# Patient Record
(demographics unavailable — no encounter records)

---

## 2024-10-08 NOTE — HISTORY OF PRESENT ILLNESS
[Other: _____] : [unfilled] [FreeTextEntry1] : RASHID WALLS is a 94 year old female here for a follow up visit. [de-identified] : Mrs. Staton has high cholesterol, HTN, MARLON (on CPAP), h/o severe aortic stenosis (s/p TAVR 4/2023), IFG, GERD, chronic pain/OA, osteoporosis, depression/anxiety, h/o anemia (resolved)  Mrs. Pack s specialists include: Dr. Peggy Blas ([facility], card), Dr. Shelton Tran ([facility], [place] surg), Dr. Jaison Aranda (GI), Dr. Momin (pain mgmt.), Dr. Zuniga (ortho, was d/c to prn f/u at most recent visit 9/2023), Dr. Leon (ortho/hand), and Dr. Braxton (neuro), Dr. Alberts (ENT), Dr. Wong (podiatry)  Taking Pravastatin 20 mg TIW and 10 mg other days as higher doses (daily 20 mg dose) caused worsening of her chronic leg pains.  She is using CPAP consistently which has helped to decrease daytime fatigue. Memory is not improved with treating the MARLON or with time past 2022 hospitalization/trauma/surgery/anaesthesia, though memory also not clearly worsening for now so for now they are deferred on trial of donepezil which is reasonable. Memory is not improved but her confusion is signif improved with treating the sleep apnea.  No new or acute sxs but sxs of fatigue and joint pain etc have been slowly worsening over time. She takes Tylenol 4 pills daily for pain. Can not tolerate Tramadol or opioids. Avoids NSAIDs given age and cardiac and other comorbidities. CBD used but not making signif difference in her sxs. Uses Lidoderm patches.  Dr. Momin and I have been trying to figure out combination of med to help both mood (depression/anxiety) as well as her chronic pain. See prior notes for details.  As of 9/2024 Dr. Momin increased her duloxetine to 60 mg daily and sertraline was stopped.  Duloxetine 60 mg helps with her back pain signif. Legs feel painful from B knees down. Sensation is decreased and feels like they don't move the way she wants them to. Doing PT and trying to be more active at home. Not clearly tingling/burning pain but might be neuropathic pain from what she is describing. Has consider trial of gabapentin with Dr. Momin in past though opted to defer due to poss SE of drowsiness/cognitive effects with med. Has not had EMG/NCV and they will d/w Dr. Braxton to see if should consider this. Will check B12 with labs today.   In 7/2024 Liv reported that her mom was experiencing episodic exhaustion associated with  bradycardia. Recommended she d/w Dr. Blas re having EP card eval.   Had heart rate monitor x 2 wks in 8/2024 and all was good. "Nothing of concern at all". Echo is also UTD and stable . Liv will sent in holter monitor results  She got to take a trip to [place] this summer and connected with some family she has not seen in a while. Loved it  Has had slow wgt gain of about 10#  over past year. Is not as active as she was in the past. Eating well overall and no signif changes and portions are reasonable. Not a snacked between meals. used to do PT 4x/wk, now doing 2x/wk, Liv is encouraging mom to incr levels of physical activity and will work on some strength training at home as well   Sometimes confused in middle of the night if wakes up, seems frightened at the time but is able to be soothed/settled and later has no recall of this. Sometimes acts in ways she would not normally act (eg tries to take off clothes). Sometimes in day feels like her memory is not as sharp/feels a bit confused but only occas and short lived. Liv and Mrs. RAMIREZ will d/w Dr. Braxton at [facility] in a few mos. They have disc option to trial Donepezil for memory in the past though decided to defer as as most of the time her memory seems ok they think they still prefer to defer on meds for now which seems reasonable. Liv will keep track of freq of these nighttime events to see if incr/worsening over time.

## 2024-10-08 NOTE — REVIEW OF SYSTEMS
[Fatigue] : fatigue [Abdominal Pain] : abdominal pain [Constipation] : constipation [Heartburn] : heartburn [Joint Pain] : joint pain [Joint Stiffness] : joint stiffness [Back Pain] : back pain [Memory Loss] : memory loss [Unsteady Walking] : ataxia [Depression] : depression [Easy Bruising] : easy bruising [Negative] : Integumentary [FreeTextEntry6] : cough is finally resolved after 4/2024 URI [Fever] : no fever [Chills] : no chills [Recent Change In Weight] : ~T recent weight change [Diarrhea] : diarrhea [Vomiting] : no vomiting [Melena] : no melena [Headache] : no headache [Dizziness] : no dizziness [Fainting] : no fainting [Suicidal] : not suicidal [Insomnia] : no insomnia [Anxiety] : no anxiety [Easy Bleeding] : no easy bleeding [Swollen Glands] : no swollen glands [FreeTextEntry2] : +fatigue chronically, stable recently, trying to be a bit more active, +slow wgt gain over past year  [FreeTextEntry7] : GERD, uses PPI med prn with good results; chronic constipation managed with Miralax, has had GI eval (Dr. Aranda) in the past  [FreeTextEntry9] : usual OA and spinal stenosis related aches and pains/stiffness [de-identified] : Neuropathic pain L arm s/p arm injury 8/2022, gait improving with time and Pt exercises, neuropathic pain B legs from knees to toes  [de-identified] : depression/anxiety due to multiple health issues, overall seems to be doing well on duloxetine at this point  [de-identified] : easier bruising over past couple of years though stable

## 2024-10-08 NOTE — REVIEW OF SYSTEMS
[Fatigue] : fatigue [Abdominal Pain] : abdominal pain [Constipation] : constipation [Heartburn] : heartburn [Joint Pain] : joint pain [Joint Stiffness] : joint stiffness [Back Pain] : back pain [Memory Loss] : memory loss [Unsteady Walking] : ataxia [Depression] : depression [Easy Bruising] : easy bruising [Negative] : Integumentary [FreeTextEntry6] : cough is finally resolved after 4/2024 URI [Fever] : no fever [Chills] : no chills [Recent Change In Weight] : ~T recent weight change [Diarrhea] : diarrhea [Vomiting] : no vomiting [Melena] : no melena [Headache] : no headache [Dizziness] : no dizziness [Fainting] : no fainting [Suicidal] : not suicidal [Insomnia] : no insomnia [Anxiety] : no anxiety [Easy Bleeding] : no easy bleeding [Swollen Glands] : no swollen glands [FreeTextEntry2] : +fatigue chronically, stable recently, trying to be a bit more active, +slow wgt gain over past year  [FreeTextEntry7] : GERD, uses PPI med prn with good results; chronic constipation managed with Miralax, has had GI eval (Dr. Aranda) in the past  [FreeTextEntry9] : usual OA and spinal stenosis related aches and pains/stiffness [de-identified] : Neuropathic pain L arm s/p arm injury 8/2022, gait improving with time and Pt exercises, neuropathic pain B legs from knees to toes  [de-identified] : depression/anxiety due to multiple health issues, overall seems to be doing well on duloxetine at this point  [de-identified] : easier bruising over past couple of years though stable

## 2024-10-08 NOTE — PLAN
[FreeTextEntry1] : Continue all medications as prescribed. Check labs as noted above (not fasting, ate lean proteins today). Will adjust any medications based upon lab results.  Has had COVID booster this fall and will try senior flu vacc in near future at pharmacy to see if well tolerated.   BP goal is <=135/85 on average on home checks. Advised to let us know if BPs are above goal on home checks.  Lifestyle measures to optimize BP reviewed, including regular exercise, adequate sleep, Mediterranean or DASH style clean eating, mindfulness/meditation, magnesium 100-400 mg supplementation, avoid NSAIDS, stress management techniques etc.  LDL goal <=70 ideally if able to achieve that but if her LDL is <100 (as it was at last check) and that is the max dose of statin she can tolerate I think that is a reasonable and perhaps more achievable goal for her at this stage of her life. Reviewed risks/benefits of statin med. Recommended excellent hydration +/- co Q10 (100-400 mg daily) to decrease risk for statin related myalgias.  Revd clean eating (eg Mediterranean style plant based eating plan recommended) and regular exercise/staying as physically active as possible. Include balance exercises and strength training and core strengthening exercises for bone health and to decrease risk for falls.  Recommended Tylenol XS or Arthritis 1-2 pills BID-TID if helpful, should really avoid/minimize NSAIDs due to higher risk for SE from these meds at her age (and revd r/b/se of NSAIDs incl CV, renal and GI etc), regular stretching, heat/ice prn, consider turmeric supplementation, consider CBD cream or oral options, gentle yoga/chair yoga, strengthen core muscles, consider chiro and/or massage and/or acupuncture. If these measures are not helpful enough then follow up with ortho and/or pain  for further eval and treatment. D/w pain mgmt re what pain treatment plan can be-- ?medical marijuana trial, other options. Try heating pad prn  Recommended calcium 1228-3951 mg daily ideally mainly or fully from food sources +/- supplement if needed, vit D 9877-2417 IU or whatever dose is needed to get D into 30-80 range. Recommended regular weight bearing exercise as well as strength training exercise 3 or more times per week and balance exercises regularly.  Reviewed importance of good self care (e.g. meditation, yoga, adequate rest, regular exercise, magnesium, clean eating, etc.). Incr social activities, try listening to music, get out in nature, get outdoors more if poss, try aromatherapy.  Follow up with specialists as recommended by them.  Follow up as scheduled in 2-3 months for RPA visit/repeat labs. Liv and Mrs. Pack prefer to continue closer follow up Qfew months given Mrs. RAMIREZ's multiple medical issues, though they have option to start to space out visits if comfortable doing so  Time spent immediately before and after, as well as Face-to-face time spent during visit with patient, over half in discussion of the above diagnoses and treatment plan: 40 minutes.

## 2024-10-08 NOTE — HEALTH RISK ASSESSMENT
[No falls in past year] : Patient reported no falls in the past year [0] : 2) Feeling down, depressed, or hopeless: Not at all (0) [PHQ-2 Negative - No further assessment needed] : PHQ-2 Negative - No further assessment needed [Never] : Never [[id number]] : 0

## 2024-10-08 NOTE — ASSESSMENT
[FreeTextEntry1] : RASHID WALLS is a 94 year old female here for follow up on medical issues as noted above.  Mrs. Staton has high cholesterol, HTN, MARLON (on CPAP), h/o severe aortic stenosis (s/p TAVR 4/2023), IFG, GERD, chronic pain/OA, osteoporosis, depression/anxiety, h/o anemia (resolved)  BLE leg pain (knees and distally). Sounds like neuropathic pain. B12 was wnl 12/2023 though she feels pain is worse in recent months so will repeat B12 level. She has appt with Dr. Momin tomorrow and will also get his input. ALso will d/w Dr. Braxton at upcoming visit and will see if she feels Mrs. RAMIREZ should consider EMG/NCV study. Can consider trial of gabapentin if pain sxs are signif life interfering though revd/acknowledged that the tradeoff may be incr fatigue/drowsiness.  Will check D level today as Dr. jacobo concerned that Duloxetine can lower D levels and though her D was wnl 6/2024 she is on higher dose of Duloxetine since then

## 2024-10-08 NOTE — HEALTH RISK ASSESSMENT
[No falls in past year] : Patient reported no falls in the past year [0] : 2) Feeling down, depressed, or hopeless: Not at all (0) [PHQ-2 Negative - No further assessment needed] : PHQ-2 Negative - No further assessment needed [Never] : Never [[id number]] : 0

## 2024-10-08 NOTE — HISTORY OF PRESENT ILLNESS
[Other: _____] : [unfilled] [FreeTextEntry1] : RASHID WALLS is a 94 year old female here for a follow up visit. [de-identified] : Mrs. Staton has high cholesterol, HTN, MARLON (on CPAP), h/o severe aortic stenosis (s/p TAVR 4/2023), IFG, GERD, chronic pain/OA, osteoporosis, depression/anxiety, h/o anemia (resolved)  Mrs. Pack s specialists include: Dr. Peggy Blas ([facility], card), Dr. Shelton Tran ([facility], [place] surg), Dr. Jaison Aranda (GI), Dr. Momin (pain mgmt.), Dr. Zuniga (ortho, was d/c to prn f/u at most recent visit 9/2023), Dr. Leon (ortho/hand), and Dr. Braxton (neuro), Dr. Alberts (ENT), Dr. Wong (podiatry)  Taking Pravastatin 20 mg TIW and 10 mg other days as higher doses (daily 20 mg dose) caused worsening of her chronic leg pains.  She is using CPAP consistently which has helped to decrease daytime fatigue. Memory is not improved with treating the MARLON or with time past 2022 hospitalization/trauma/surgery/anaesthesia, though memory also not clearly worsening for now so for now they are deferred on trial of donepezil which is reasonable. Memory is not improved but her confusion is signif improved with treating the sleep apnea.  No new or acute sxs but sxs of fatigue and joint pain etc have been slowly worsening over time. She takes Tylenol 4 pills daily for pain. Can not tolerate Tramadol or opioids. Avoids NSAIDs given age and cardiac and other comorbidities. CBD used but not making signif difference in her sxs. Uses Lidoderm patches.  Dr. Momin and I have been trying to figure out combination of med to help both mood (depression/anxiety) as well as her chronic pain. See prior notes for details.  As of 9/2024 Dr. Momin increased her duloxetine to 60 mg daily and sertraline was stopped.  Duloxetine 60 mg helps with her back pain signif. Legs feel painful from B knees down. Sensation is decreased and feels like they don't move the way she wants them to. Doing PT and trying to be more active at home. Not clearly tingling/burning pain but might be neuropathic pain from what she is describing. Has consider trial of gabapentin with Dr. Momin in past though opted to defer due to poss SE of drowsiness/cognitive effects with med. Has not had EMG/NCV and they will d/w Dr. Braxton to see if should consider this. Will check B12 with labs today.   In 7/2024 Liv reported that her mom was experiencing episodic exhaustion associated with  bradycardia. Recommended she d/w Dr. Blas re having EP card eval.   Had heart rate monitor x 2 wks in 8/2024 and all was good. "Nothing of concern at all". Echo is also UTD and stable . Liv will sent in holter monitor results  She got to take a trip to [place] this summer and connected with some family she has not seen in a while. Loved it  Has had slow wgt gain of about 10#  over past year. Is not as active as she was in the past. Eating well overall and no signif changes and portions are reasonable. Not a snacked between meals. used to do PT 4x/wk, now doing 2x/wk, Liv is encouraging mom to incr levels of physical activity and will work on some strength training at home as well   Sometimes confused in middle of the night if wakes up, seems frightened at the time but is able to be soothed/settled and later has no recall of this. Sometimes acts in ways she would not normally act (eg tries to take off clothes). Sometimes in day feels like her memory is not as sharp/feels a bit confused but only occas and short lived. Liv and Mrs. RAMIREZ will d/w Dr. Braxton at [facility] in a few mos. They have disc option to trial Donepezil for memory in the past though decided to defer as as most of the time her memory seems ok they think they still prefer to defer on meds for now which seems reasonable. Liv will keep track of freq of these nighttime events to see if incr/worsening over time.

## 2024-10-08 NOTE — PHYSICAL EXAM
[No Acute Distress] : no acute distress [Well Nourished] : well nourished [Well Developed] : well developed [Well-Appearing] : well-appearing [Normal Sclera/Conjunctiva] : normal sclera/conjunctiva [EOMI] : extraocular movements intact [Normal Outer Ear/Nose] : the outer ears and nose were normal in appearance [Normal Oropharynx] : the oropharynx was normal [No JVD] : no jugular venous distention [No Lymphadenopathy] : no lymphadenopathy [Supple] : supple [Thyroid Normal, No Nodules] : the thyroid was normal and there were no nodules present [No Respiratory Distress] : no respiratory distress  [No Accessory Muscle Use] : no accessory muscle use [Clear to Auscultation] : lungs were clear to auscultation bilaterally [Normal Rate] : normal rate  [Regular Rhythm] : with a regular rhythm [Normal S1, S2] : normal S1 and S2 [No Carotid Bruits] : no carotid bruits [Pedal Pulses Present] : the pedal pulses are present [No Edema] : there was no peripheral edema [No Extremity Clubbing/Cyanosis] : no extremity clubbing/cyanosis [Soft] : abdomen soft [Non Tender] : non-tender [Non-distended] : non-distended [No Masses] : no abdominal mass palpated [No HSM] : no HSM [Normal Bowel Sounds] : normal bowel sounds [Normal Posterior Cervical Nodes] : no posterior cervical lymphadenopathy [Normal Anterior Cervical Nodes] : no anterior cervical lymphadenopathy [No CVA Tenderness] : no CVA  tenderness [No Spinal Tenderness] : no spinal tenderness [No Joint Swelling] : no joint swelling [Grossly Normal Strength/Tone] : grossly normal strength/tone [No Rash] : no rash [Coordination Grossly Intact] : coordination grossly intact [No Focal Deficits] : no focal deficits [Normal Affect] : the affect was normal [Normal Insight/Judgement] : insight and judgment were intact [de-identified] : wgt incr by 5# on our scale since Summer 2023 [de-identified] : 1-2/6 soft quiet murmur systolic murmur heard best along LUSB; 2+ B DP pulses [de-identified] : ,  [de-identified] : +sclerotic joints B hands/wrists/knees etc but no s/sxs acute synovitis  [de-identified] : no jaundice or petechiae or unusual bruising  [de-identified] : gait is slowed and she uses cane /daughter for assistance in office (and she uses walker in home always) [de-identified] : good eye contact, easily engaged in conversation, somewhat tired affect

## 2024-10-08 NOTE — PHYSICAL EXAM
[No Acute Distress] : no acute distress [Well Nourished] : well nourished [Well Developed] : well developed [Well-Appearing] : well-appearing [Normal Sclera/Conjunctiva] : normal sclera/conjunctiva [EOMI] : extraocular movements intact [Normal Outer Ear/Nose] : the outer ears and nose were normal in appearance [Normal Oropharynx] : the oropharynx was normal [No JVD] : no jugular venous distention [No Lymphadenopathy] : no lymphadenopathy [Supple] : supple [Thyroid Normal, No Nodules] : the thyroid was normal and there were no nodules present [No Respiratory Distress] : no respiratory distress  [No Accessory Muscle Use] : no accessory muscle use [Clear to Auscultation] : lungs were clear to auscultation bilaterally [Normal Rate] : normal rate  [Regular Rhythm] : with a regular rhythm [Normal S1, S2] : normal S1 and S2 [No Carotid Bruits] : no carotid bruits [Pedal Pulses Present] : the pedal pulses are present [No Edema] : there was no peripheral edema [No Extremity Clubbing/Cyanosis] : no extremity clubbing/cyanosis [Soft] : abdomen soft [Non Tender] : non-tender [Non-distended] : non-distended [No Masses] : no abdominal mass palpated [No HSM] : no HSM [Normal Bowel Sounds] : normal bowel sounds [Normal Posterior Cervical Nodes] : no posterior cervical lymphadenopathy [Normal Anterior Cervical Nodes] : no anterior cervical lymphadenopathy [No CVA Tenderness] : no CVA  tenderness [No Spinal Tenderness] : no spinal tenderness [No Joint Swelling] : no joint swelling [Grossly Normal Strength/Tone] : grossly normal strength/tone [No Rash] : no rash [Coordination Grossly Intact] : coordination grossly intact [No Focal Deficits] : no focal deficits [Normal Affect] : the affect was normal [Normal Insight/Judgement] : insight and judgment were intact [de-identified] : wgt incr by 5# on our scale since Summer 2023 [de-identified] : 1-2/6 soft quiet murmur systolic murmur heard best along LUSB; 2+ B DP pulses [de-identified] : ,  [de-identified] : +sclerotic joints B hands/wrists/knees etc but no s/sxs acute synovitis  [de-identified] : no jaundice or petechiae or unusual bruising  [de-identified] : gait is slowed and she uses cane /daughter for assistance in office (and she uses walker in home always) [de-identified] : good eye contact, easily engaged in conversation, somewhat tired affect

## 2024-10-08 NOTE — PLAN
[FreeTextEntry1] : Continue all medications as prescribed. Check labs as noted above (not fasting, ate lean proteins today). Will adjust any medications based upon lab results.  Has had COVID booster this fall and will try senior flu vacc in near future at pharmacy to see if well tolerated.   BP goal is <=135/85 on average on home checks. Advised to let us know if BPs are above goal on home checks.  Lifestyle measures to optimize BP reviewed, including regular exercise, adequate sleep, Mediterranean or DASH style clean eating, mindfulness/meditation, magnesium 100-400 mg supplementation, avoid NSAIDS, stress management techniques etc.  LDL goal <=70 ideally if able to achieve that but if her LDL is <100 (as it was at last check) and that is the max dose of statin she can tolerate I think that is a reasonable and perhaps more achievable goal for her at this stage of her life. Reviewed risks/benefits of statin med. Recommended excellent hydration +/- co Q10 (100-400 mg daily) to decrease risk for statin related myalgias.  Revd clean eating (eg Mediterranean style plant based eating plan recommended) and regular exercise/staying as physically active as possible. Include balance exercises and strength training and core strengthening exercises for bone health and to decrease risk for falls.  Recommended Tylenol XS or Arthritis 1-2 pills BID-TID if helpful, should really avoid/minimize NSAIDs due to higher risk for SE from these meds at her age (and revd r/b/se of NSAIDs incl CV, renal and GI etc), regular stretching, heat/ice prn, consider turmeric supplementation, consider CBD cream or oral options, gentle yoga/chair yoga, strengthen core muscles, consider chiro and/or massage and/or acupuncture. If these measures are not helpful enough then follow up with ortho and/or pain  for further eval and treatment. D/w pain mgmt re what pain treatment plan can be-- ?medical marijuana trial, other options. Try heating pad prn  Recommended calcium 0375-5062 mg daily ideally mainly or fully from food sources +/- supplement if needed, vit D 4393-5266 IU or whatever dose is needed to get D into 30-80 range. Recommended regular weight bearing exercise as well as strength training exercise 3 or more times per week and balance exercises regularly.  Reviewed importance of good self care (e.g. meditation, yoga, adequate rest, regular exercise, magnesium, clean eating, etc.). Incr social activities, try listening to music, get out in nature, get outdoors more if poss, try aromatherapy.  Follow up with specialists as recommended by them.  Follow up as scheduled in 2-3 months for RPA visit/repeat labs. Liv and Mrs. Pack prefer to continue closer follow up Qfew months given Mrs. RAMIREZ's multiple medical issues, though they have option to start to space out visits if comfortable doing so  Time spent immediately before and after, as well as Face-to-face time spent during visit with patient, over half in discussion of the above diagnoses and treatment plan: 40 minutes.

## 2025-01-22 NOTE — PLAN
[FreeTextEntry1] : Continue all medications as prescribed. Check labs as noted (she had hard boiled egg) above incl lab requests from Dr. Branch. Will adjust any medications based upon lab results.  BP goal is <=135/85 on average on home checks. Advised to let us know if BPs are above goal on home checks.  Lifestyle measures to optimize BP reviewed, including regular exercise, adequate sleep, Mediterranean or DASH style clean eating, mindfulness/meditation, magnesium 100-400 mg supplementation, avoid NSAIDS, stress management techniques etc.  LDL goal <=70 ideally if able to achieve that but if her LDL is <100 (as it was at last check) and that is the max dose of statin she can tolerate I think that is a reasonable and perhaps more achievable goal for her at this stage of her life. Reviewed risks/benefits of statin med. Recommended excellent hydration +/- co Q10 (100-400 mg daily) to decrease risk for statin related myalgias.  Revd clean eating (eg Mediterranean style plant based eating plan recommended) and regular exercise/staying as physically active as possible. Include balance exercises and strength training and core strengthening exercises for bone health and to decrease risk for falls.  Recommended Tylenol XS or Arthritis 1-2 pills BID-TID if helpful, should really avoid/minimize NSAIDs due to higher risk for SE from these meds at her age (and revd r/b/se of NSAIDs incl CV, renal and GI etc), regular stretching, heat/ice prn, consider turmeric supplementation, consider CBD cream or oral options, gentle yoga/chair yoga, strengthen core muscles, consider chiro and/or massage and/or acupuncture. If these measures are not helpful enough then follow up with ortho and/or pain  for further eval and treatment. D/w pain mgmt re what pain treatment plan can be-- ?medical marijuana trial, other options. Try heating pad prn  Recommended calcium 8194-8103 mg daily ideally mainly or fully from food sources +/- supplement if needed, vit D 5369-4839 IU or whatever dose is needed to get D into 30-80 range. Recommended regular weight bearing exercise as well as strength training exercise 3 or more times per week and balance exercises regularly.  Reviewed importance of good self care (e.g. meditation, yoga, adequate rest, regular exercise, magnesium, clean eating, etc.). Incr social activities, try listening to music, get out in nature, get outdoors more if poss, try aromatherapy.  Follow up with specialists as recommended by them.  Follow up as scheduled in 2-3 months for CPE/RPA visit/repeat labs. Liv and Mrs. Pack prefer to continue closer follow up Qfew months given Mrs. RAMIREZ's multiple medical issues, though they have option to start to space out visits if comfortable doing so  Time spent immediately before and after, as well as Face-to-face time spent during visit with patient, over half in discussion of the above diagnoses and treatment plan: 40-45 minutes.

## 2025-01-22 NOTE — HISTORY OF PRESENT ILLNESS
[Other: _____] : [unfilled] [FreeTextEntry1] : RASHID WALLS is a 94 year old female here for a follow up visit. [de-identified] : Mrs. Staton has high cholesterol, HTN, MARLON (on CPAP), h/o severe aortic stenosis (s/p TAVR 4/2023), IFG, GERD, chronic pain/OA, osteoporosis, depression/anxiety, h/o anemia (resolved)  Mrs. Pack s specialists include: Dr. Peggy Blas ([facility], card), Dr. Shelton Tran ([facility], [place] surg), Dr. Jaison Aranda (GI), Dr. Momin (pain mgmt.), Dr. Zuniga (ortho, was d/c to prn f/u at most recent visit 9/2023), Dr. Leon (ortho/hand), and Dr. Braxton (neuro), Dr. Alberts (ENT), Dr. Wong (podiatry)  Taking Pravastatin 20 mg TIW and 10 mg other days as higher doses (daily 20 mg dose) caused worsening of her chronic leg pains.  She is using CPAP consistently which has helped to decrease daytime fatigue. Memory is not improved with treating the MARLON or with time past 2022 hospitalization/trauma/surgery/anaesthesia, though memory also not clearly worsening for now so for now they are deferred on trial of donepezil which is reasonable. Memory is not improved but her confusion is signif improved with treating the sleep apnea.  No new or acute sxs but sxs of fatigue and joint pain etc have been slowly worsening over time. She takes Tylenol 4 pills daily for pain. Can not tolerate Tramadol or opioids. Avoids NSAIDs given age and cardiac and other comorbidities. CBD used but not making signif difference in her sxs. Uses Lidoderm patches.  Duloxetine 60 mg helps with her back pain signif. Legs feel painful from B knees down. Sensation is decreased and feels like they don't move the way she wants them to. Doing PT and trying to be more active at home. Not clearly tingling/burning pain but might be neuropathic pain from what she is describing. Has not had EMG/NCV and they will d/w Dr. Braxton (has follow up appt sched for Feb 2025) to see if should consider this.   Duloxetine does not help her mood/depression as much as the sertraline did though as she is on Duloxetine we had to wean her off the sertraline to decrease risk for serotonin syndrome etc. For this reason I am also reluctant to consider adding a med like Wellbutrin to her Duloxetine regimen. Mrs. Pack and family have been trying to help boost mood with self care measures such as mindful/relaxation breathing, getting a little exercise as best she can etc. We are discussing today option to seek psychiatry consultation/behavioral health consultation for input/guidance re med mgmt.  Mood is doing ok for now. They defer on psychiatry and  referral at this time though will keep in mind.   Doing PT BIW, using 5-10# wgts. Trying to ambulate as often as possible during the day.   Wgt slowly increasing x 18 months. Feels abdomen is becoming more distended/bloated/increased girth and firm feeling though not true abd pain . No vomiting, Has good Bms regularly . Is concerned about poss signifi of expanding abd girth/wgt gain. No SOB. No rapid wgt gain.   Requesting labs (showed me list of orders) for Dr. Branch. Requests RF PT Rx

## 2025-01-22 NOTE — PLAN
[FreeTextEntry1] : Continue all medications as prescribed. Check labs as noted (she had hard boiled egg) above incl lab requests from Dr. Branch. Will adjust any medications based upon lab results.  BP goal is <=135/85 on average on home checks. Advised to let us know if BPs are above goal on home checks.  Lifestyle measures to optimize BP reviewed, including regular exercise, adequate sleep, Mediterranean or DASH style clean eating, mindfulness/meditation, magnesium 100-400 mg supplementation, avoid NSAIDS, stress management techniques etc.  LDL goal <=70 ideally if able to achieve that but if her LDL is <100 (as it was at last check) and that is the max dose of statin she can tolerate I think that is a reasonable and perhaps more achievable goal for her at this stage of her life. Reviewed risks/benefits of statin med. Recommended excellent hydration +/- co Q10 (100-400 mg daily) to decrease risk for statin related myalgias.  Revd clean eating (eg Mediterranean style plant based eating plan recommended) and regular exercise/staying as physically active as possible. Include balance exercises and strength training and core strengthening exercises for bone health and to decrease risk for falls.  Recommended Tylenol XS or Arthritis 1-2 pills BID-TID if helpful, should really avoid/minimize NSAIDs due to higher risk for SE from these meds at her age (and revd r/b/se of NSAIDs incl CV, renal and GI etc), regular stretching, heat/ice prn, consider turmeric supplementation, consider CBD cream or oral options, gentle yoga/chair yoga, strengthen core muscles, consider chiro and/or massage and/or acupuncture. If these measures are not helpful enough then follow up with ortho and/or pain  for further eval and treatment. D/w pain mgmt re what pain treatment plan can be-- ?medical marijuana trial, other options. Try heating pad prn  Recommended calcium 5547-5948 mg daily ideally mainly or fully from food sources +/- supplement if needed, vit D 3846-1893 IU or whatever dose is needed to get D into 30-80 range. Recommended regular weight bearing exercise as well as strength training exercise 3 or more times per week and balance exercises regularly.  Reviewed importance of good self care (e.g. meditation, yoga, adequate rest, regular exercise, magnesium, clean eating, etc.). Incr social activities, try listening to music, get out in nature, get outdoors more if poss, try aromatherapy.  Follow up with specialists as recommended by them.  Follow up as scheduled in 2-3 months for CPE/RPA visit/repeat labs. Liv and Mrs. Pack prefer to continue closer follow up Qfew months given Mrs. RAMIREZ's multiple medical issues, though they have option to start to space out visits if comfortable doing so  Time spent immediately before and after, as well as Face-to-face time spent during visit with patient, over half in discussion of the above diagnoses and treatment plan: 40-45 minutes.

## 2025-01-22 NOTE — PHYSICAL EXAM
[No Acute Distress] : no acute distress [Well Nourished] : well nourished [Well Developed] : well developed [Well-Appearing] : well-appearing [Normal Sclera/Conjunctiva] : normal sclera/conjunctiva [EOMI] : extraocular movements intact [Normal Outer Ear/Nose] : the outer ears and nose were normal in appearance [Normal Oropharynx] : the oropharynx was normal [No JVD] : no jugular venous distention [No Lymphadenopathy] : no lymphadenopathy [Supple] : supple [Thyroid Normal, No Nodules] : the thyroid was normal and there were no nodules present [No Respiratory Distress] : no respiratory distress  [No Accessory Muscle Use] : no accessory muscle use [Clear to Auscultation] : lungs were clear to auscultation bilaterally [Normal Rate] : normal rate  [Regular Rhythm] : with a regular rhythm [Normal S1, S2] : normal S1 and S2 [No Carotid Bruits] : no carotid bruits [Pedal Pulses Present] : the pedal pulses are present [No Edema] : there was no peripheral edema [No Extremity Clubbing/Cyanosis] : no extremity clubbing/cyanosis [Soft] : abdomen soft [Non Tender] : non-tender [Non-distended] : non-distended [No Masses] : no abdominal mass palpated [No HSM] : no HSM [Normal Bowel Sounds] : normal bowel sounds [Normal Posterior Cervical Nodes] : no posterior cervical lymphadenopathy [Normal Anterior Cervical Nodes] : no anterior cervical lymphadenopathy [No CVA Tenderness] : no CVA  tenderness [No Spinal Tenderness] : no spinal tenderness [No Joint Swelling] : no joint swelling [Grossly Normal Strength/Tone] : grossly normal strength/tone [No Rash] : no rash [Coordination Grossly Intact] : coordination grossly intact [No Focal Deficits] : no focal deficits [Normal Affect] : the affect was normal [Normal Insight/Judgement] : insight and judgment were intact [de-identified] : venessa incr by 6# since Oct 2024 [de-identified] : 1-2/6 soft quiet murmur systolic murmur heard best along LUSB; 2+ B DP pulses [de-identified] : ,  [de-identified] : +sclerotic joints B hands/wrists/knees etc but no s/sxs acute synovitis  [de-identified] : no jaundice or petechiae or unusual bruising  [de-identified] : gait is slowed and she uses cane /daughter for assistance in office (and she uses walker in home always) [de-identified] : good eye contact, easily engaged in conversation, somewhat tired affect

## 2025-01-22 NOTE — HEALTH RISK ASSESSMENT
[No falls in past year] : Patient reported no falls in the past year [0] : 2) Feeling down, depressed, or hopeless: Not at all (0) [PHQ-2 Negative - No further assessment needed] : PHQ-2 Negative - No further assessment needed [Never] : Never [[id number]] : 0

## 2025-01-22 NOTE — REVIEW OF SYSTEMS
[Fatigue] : fatigue [Recent Change In Weight] : ~T recent weight change [Constipation] : constipation [Heartburn] : heartburn [Joint Pain] : joint pain [Joint Stiffness] : joint stiffness [Back Pain] : back pain [Memory Loss] : memory loss [Unsteady Walking] : ataxia [Depression] : depression [Easy Bruising] : easy bruising [Negative] : Integumentary [Fever] : no fever [Chills] : no chills [Abdominal Pain] : no abdominal pain [Diarrhea] : diarrhea [Vomiting] : no vomiting [Melena] : no melena [Headache] : no headache [Dizziness] : no dizziness [Fainting] : no fainting [Suicidal] : not suicidal [Insomnia] : no insomnia [Anxiety] : no anxiety [Easy Bleeding] : no easy bleeding [Swollen Glands] : no swollen glands [FreeTextEntry2] : +fatigue chronically, stable recently, trying to be a bit more active, +slow wgt gain over past 18 mos  [FreeTextEntry7] : GERD, uses PPI med prn with good results; chronic constipation managed with Miralax, has had GI eval (Dr. Aranda) in the past but not for a few yrs, +expanding abd girth over past year plus, see HPI [FreeTextEntry9] : usual OA and spinal stenosis related aches and pains/stiffness [de-identified] : Neuropathic pain L arm s/p arm injury 8/2022, gait improving with time and Pt exercises, neuropathic pain B legs from knees to toes  [de-identified] : depression/anxiety due to multiple health issues, overall seems to be doing well on duloxetine at this point  [de-identified] : easier bruising over past couple of years though stable

## 2025-01-22 NOTE — ASSESSMENT
[FreeTextEntry1] : RASHID WALLS is a 94 year old female here for follow up on medical issues as noted above.  Mrs. Staton has high cholesterol, HTN, MARLON (on CPAP), h/o severe aortic stenosis (s/p TAVR 4/2023), IFG, GERD, chronic pain/OA, osteoporosis, depression/anxiety, h/o anemia (resolved)  She has had some wgt gain over past 18 months. This may be weight regain as she recovered from her hosp/injuries and/or related to lower activity levels (caloric intake more than expenditure). Revd trying to keep calories in stable and incr calories burned (eg more physical activity levels).   Disc pros/cons of abd/pelvis CT and she will consider if wants to do this test as next step in evaluation. WIll also consider seeing GI for eval if incr/new sxs or if CT is done and shows GI issue. She feels constipation is not an issue at this time and will cont to make sure she is evacuating stool well (feels is at this time)

## 2025-01-22 NOTE — REVIEW OF SYSTEMS
[Fatigue] : fatigue [Recent Change In Weight] : ~T recent weight change [Constipation] : constipation [Heartburn] : heartburn [Joint Pain] : joint pain [Joint Stiffness] : joint stiffness [Back Pain] : back pain [Memory Loss] : memory loss [Unsteady Walking] : ataxia [Depression] : depression [Easy Bruising] : easy bruising [Negative] : Integumentary [Fever] : no fever [Chills] : no chills [Abdominal Pain] : no abdominal pain [Diarrhea] : diarrhea [Vomiting] : no vomiting [Melena] : no melena [Headache] : no headache [Dizziness] : no dizziness [Fainting] : no fainting [Suicidal] : not suicidal [Insomnia] : no insomnia [Anxiety] : no anxiety [Easy Bleeding] : no easy bleeding [Swollen Glands] : no swollen glands [FreeTextEntry2] : +fatigue chronically, stable recently, trying to be a bit more active, +slow wgt gain over past 18 mos  [FreeTextEntry7] : GERD, uses PPI med prn with good results; chronic constipation managed with Miralax, has had GI eval (Dr. Aranda) in the past but not for a few yrs, +expanding abd girth over past year plus, see HPI [FreeTextEntry9] : usual OA and spinal stenosis related aches and pains/stiffness [de-identified] : Neuropathic pain L arm s/p arm injury 8/2022, gait improving with time and Pt exercises, neuropathic pain B legs from knees to toes  [de-identified] : depression/anxiety due to multiple health issues, overall seems to be doing well on duloxetine at this point  [de-identified] : easier bruising over past couple of years though stable

## 2025-01-22 NOTE — PHYSICAL EXAM
[No Acute Distress] : no acute distress [Well Nourished] : well nourished [Well-Appearing] : well-appearing [Well Developed] : well developed [Normal Sclera/Conjunctiva] : normal sclera/conjunctiva [EOMI] : extraocular movements intact [Normal Outer Ear/Nose] : the outer ears and nose were normal in appearance [Normal Oropharynx] : the oropharynx was normal [No JVD] : no jugular venous distention [Supple] : supple [No Lymphadenopathy] : no lymphadenopathy [Thyroid Normal, No Nodules] : the thyroid was normal and there were no nodules present [No Respiratory Distress] : no respiratory distress  [No Accessory Muscle Use] : no accessory muscle use [Clear to Auscultation] : lungs were clear to auscultation bilaterally [Normal Rate] : normal rate  [Regular Rhythm] : with a regular rhythm [Normal S1, S2] : normal S1 and S2 [No Carotid Bruits] : no carotid bruits [Pedal Pulses Present] : the pedal pulses are present [No Edema] : there was no peripheral edema [No Extremity Clubbing/Cyanosis] : no extremity clubbing/cyanosis [Soft] : abdomen soft [Non Tender] : non-tender [Non-distended] : non-distended [No Masses] : no abdominal mass palpated [No HSM] : no HSM [Normal Bowel Sounds] : normal bowel sounds [Normal Posterior Cervical Nodes] : no posterior cervical lymphadenopathy [Normal Anterior Cervical Nodes] : no anterior cervical lymphadenopathy [No CVA Tenderness] : no CVA  tenderness [No Spinal Tenderness] : no spinal tenderness [No Joint Swelling] : no joint swelling [Grossly Normal Strength/Tone] : grossly normal strength/tone [No Rash] : no rash [Coordination Grossly Intact] : coordination grossly intact [No Focal Deficits] : no focal deficits [Normal Affect] : the affect was normal [Normal Insight/Judgement] : insight and judgment were intact [de-identified] : venessa incr by 6# since Oct 2024 [de-identified] : 1-2/6 soft quiet murmur systolic murmur heard best along LUSB; 2+ B DP pulses [de-identified] : ,  [de-identified] : +sclerotic joints B hands/wrists/knees etc but no s/sxs acute synovitis  [de-identified] : no jaundice or petechiae or unusual bruising  [de-identified] : gait is slowed and she uses cane /daughter for assistance in office (and she uses walker in home always) [de-identified] : good eye contact, easily engaged in conversation, somewhat tired affect

## 2025-01-22 NOTE — HISTORY OF PRESENT ILLNESS
[Other: _____] : [unfilled] [FreeTextEntry1] : RASHID WALLS is a 94 year old female here for a follow up visit. [de-identified] : Mrs. Staton has high cholesterol, HTN, MARLON (on CPAP), h/o severe aortic stenosis (s/p TAVR 4/2023), IFG, GERD, chronic pain/OA, osteoporosis, depression/anxiety, h/o anemia (resolved)  Mrs. Pack s specialists include: Dr. Peggy Blas ([facility], card), Dr. Shelton Tran ([facility], CT surg), Dr. Jaison Aranda (GI), Dr. Momin (pain mgmt.), Dr. Zuniga (ortho, was d/c to prn f/u at most recent visit 9/2023), Dr. Leon (ortho/hand), and Dr. Braxton (neuro), Dr. Alberts (ENT), Dr. Wong (podiatry)  Taking Pravastatin 20 mg TIW and 10 mg other days as higher doses (daily 20 mg dose) caused worsening of her chronic leg pains.  She is using CPAP consistently which has helped to decrease daytime fatigue. Memory is not improved with treating the MARLON or with time past 2022 hospitalization/trauma/surgery/anaesthesia, though memory also not clearly worsening for now so for now they are deferred on trial of donepezil which is reasonable. Memory is not improved but her confusion is signif improved with treating the sleep apnea.  No new or acute sxs but sxs of fatigue and joint pain etc have been slowly worsening over time. She takes Tylenol 4 pills daily for pain. Can not tolerate Tramadol or opioids. Avoids NSAIDs given age and cardiac and other comorbidities. CBD used but not making signif difference in her sxs. Uses Lidoderm patches.  Duloxetine 60 mg helps with her back pain signif. Legs feel painful from B knees down. Sensation is decreased and feels like they don't move the way she wants them to. Doing PT and trying to be more active at home. Not clearly tingling/burning pain but might be neuropathic pain from what she is describing. Has not had EMG/NCV and they will d/w Dr. Braxton (has follow up appt sched for Feb 2025) to see if should consider this.   Duloxetine does not help her mood/depression as much as the sertraline did though as she is on Duloxetine we had to wean her off the sertraline to decrease risk for serotonin syndrome etc. For this reason I am also reluctant to consider adding a med like Wellbutrin to her Duloxetine regimen. Mrs. Pack and family have been trying to help boost mood with self care measures such as mindful/relaxation breathing, getting a little exercise as best she can etc. We are discussing today option to seek psychiatry consultation/behavioral health consultation for input/guidance re med mgmt.  Mood is doing ok for now. They defer on psychiatry and  referral at this time though will keep in mind.   Doing PT BIW, using 5-10# wgts. Trying to ambulate as often as possible during the day.   Wgt slowly increasing x 18 months. Feels abdomen is becoming more distended/bloated/increased girth and firm feeling though not true abd pain . No vomiting, Has good Bms regularly . Is concerned about poss signifi of expanding abd girth/wgt gain. No SOB. No rapid wgt gain.   Requesting labs (showed me list of orders) for Dr. Branch. Requests RF PT Rx

## 2025-02-05 NOTE — HISTORY OF PRESENT ILLNESS
[FreeTextEntry1] : 6/27/23: Ms. Willett is here today for neurology evaluation. She is here today with her daughter, Liv. About one year ago she fell in the kitchen and developed a severe hematoma on the left thigh which resulted in blood loss.  She had a significant drop in blood pressure and required pressors.  She was admitted to the ICU where she was disoriented. In the ICU she somehow fell out of bed and sustained more injuries. She eventually needed anesthesia in the OR to have her shoulder reduced. Her daughter says that her confusion improved after discharge but she has not come back to her baseline. Her daughter notes worsening in cognition when tired, or under stress/anxiety. When she is well rested she is closer to her baseline. Prior to the hospitalization she had mild cognitive impairment, diagnosed by Dr. Bedoya 5-6 years ago.  AT home she walks with a cane and assistance from her daughter or with a rollator. Her daughter helps with showering and dressing.  There were two episodes over the past year of there being difficult to arouse in the middle of the day. When she woke up she was fine. Sometimes she is confused when she wakes up.  At times she feels down. She reports sleeping well. Years ago she was diagnosed with obstructive sleep apnea. She was unable to tolerate using CPAP. She spends a lot of the time going to appointments including PT.   10/9/23: Her daughter is with her on the call. Her daughter notes some fluctuation in the number of events. She frequently wakes up feeling annoyed by the mask and wanting to take it off. She is currently using a nasal pillow mask. She thinks that she has a standard frame and wonders if a small frame may be better. A chin strap also slides out of position.  Compliance data through 10/5/23 shows an average nightly usage of 6 hours 44 minutes and an average AHI of 6.8. Leakage does not appear to be significant.   3/21/24: She is with her daughter. A 30 day compliance report through 3/19 shows usage for 30/30 days with an average usage of 8 hours 13 minutes. The Average AHI is 6.5. Her daughter says that she will often take the mask out during the night.  She still may take an afternoon nap but is not falling asleep in the morning and is more alert overall.   Memory continues to be an issue. Her daughter notes that chunks of memory are gone. She is not noticing a decline in short term memory.  She was started on duloxetine 20 mg/day for neuropathic pain. Her daughter has noticed an improvement.  2/5/25: She is here today with her daughter. She reports good days and bad days in terms of mood. She is frustrated with her lack of mobility. Duloxetine has been gradually increased from 20 mg to 60 mg and sertraline was tapered off. Her daughter thinks that sertraline was better overall for mood but duloxetine has made a significant improvement in pain.  Compliance report shows that she has used APAP for 30/30 days for an average of 9 hours per night. The average AHI is 8. her daughter does note leakage while using a nasal pillow mask. However, this has been the most comfortable for her. Her daughter also is concerned that she may not hear her if she is calling her while wearing a full face mask. Significant leakage seems to be an issue on only 3 of the last 30 nights.  At times she may act out a dream or have a hallucination at night.   Her daughter says that she is missing long term memory from prior to her hospitalization. She does form new memories.

## 2025-02-05 NOTE — PHYSICAL EXAM
[FreeTextEntry1] : Examination: Constitutional: normal, no apparent distress Eyes: normal conjunctiva b/l, no ptosis, visual fields full Respiratory: no respiratory distress, normal effort, normal auscultation Cardiovascular: normal rate, rhythm, no murmurs Neck: supple, no masses Vascular: carotids normal Skin: normal color, no rashes Psych: normal mood, affect  Neurological: Language intact/no aphasia Cranial Nerves: II-XII intact, Pupils equally round and reactive to light, ocular muscles/movements intact, no ptosis, no facial weakness, tongue protrudes normally in the midline,  Motor: normal tone, no pronator drift, full strength in all four extremities in the proximal and distal muscle groups Coordination: Fine motor movements intact, rapid alternating movements intact, finger to nose intact bilaterally Sensory: intact to light touch DTRs: symmetric, normal Gait: using walker

## 2025-02-05 NOTE — DATA REVIEWED
[de-identified] : MRI head 2/17/2016\par  Mild supratentorial white matter and pontine microvascular angiopathy. [de-identified] : 6/7/23:  vitamin B12 1050, TSH 1.27  CT head 8/30/22: No acute intracranial hemorrhage, mass effect, or acute osseous fracture.  Home sleep study 7/7/23: The home sleep study shows evidence of a severe degree of sleep apnea with an AHI of 52.9. Central apneas are seen but the majority of events are obstructive.

## 2025-02-05 NOTE — DISCUSSION/SUMMARY
[FreeTextEntry1] : Ms. Willett is a 94 year old woman with a history of mild cognitive impairment.  In August 2022 she was admitted to the hospital after a fall resulting in a large hematoma on her left thigh. She had an episode of hypotension requiring pressors. While in the ICU she fell out of bed, sustained orthopedic injuries and required surgery with anesthesia. She had a significant worsening of cognition during this hospitalization. She has gradually been improving but is not get back to her baseline from early August 2022. Vitamin B12 and TSH levels are normal. CT head in the hospital did not show any acute pathology. It is possible that she had cognitive decline secondary to injury, possible decreased cerebral perfusion in the setting of hypotension, anesthesia. The fact that she is improving is less suggestive of a neuro degenerative process. However, given her age it is also possible that this is playing a factor. We will hold off on medication for now since the presentation is less suggestive of a neuro degenerative process such as Alzheimer's disease. However, if she seems to be getting worse, donepezil can be started. We discussed the importance of continuing with physical therapy and also increasing mentally stimulating activities. Continue with physical therapy.   MARLON A home sleep study showed severe MARLON with an AHI of 52.9. She has been compliant with CPAP. The average AHI is 8 which is a significant improvement from baseline. Residual events are likely related to the low pressure setting.  Her daughter and I agree some breakthrough events are a reasonable trade off for comfort. We will keep it at a max of 7 cm H2O for comfort.   -Continue duloxetine for pain control.  -Mentally stimulating activities.  f/u 6 months.

## 2025-02-05 NOTE — DATA REVIEWED
[de-identified] : MRI head 2/17/2016\par  Mild supratentorial white matter and pontine microvascular angiopathy. [de-identified] : 6/7/23:  vitamin B12 1050, TSH 1.27  CT head 8/30/22: No acute intracranial hemorrhage, mass effect, or acute osseous fracture.  Home sleep study 7/7/23: The home sleep study shows evidence of a severe degree of sleep apnea with an AHI of 52.9. Central apneas are seen but the majority of events are obstructive.

## 2025-03-21 NOTE — PHYSICAL EXAM
[No Acute Distress] : no acute distress [Well Nourished] : well nourished [Well Developed] : well developed [Well-Appearing] : well-appearing [Normal Sclera/Conjunctiva] : normal sclera/conjunctiva [EOMI] : extraocular movements intact [Normal Outer Ear/Nose] : the outer ears and nose were normal in appearance [Normal Oropharynx] : the oropharynx was normal [No JVD] : no jugular venous distention [No Lymphadenopathy] : no lymphadenopathy [Supple] : supple [Thyroid Normal, No Nodules] : the thyroid was normal and there were no nodules present [No Respiratory Distress] : no respiratory distress  [No Accessory Muscle Use] : no accessory muscle use [Clear to Auscultation] : lungs were clear to auscultation bilaterally [Normal Rate] : normal rate  [Regular Rhythm] : with a regular rhythm [Normal S1, S2] : normal S1 and S2 [No Carotid Bruits] : no carotid bruits [Pedal Pulses Present] : the pedal pulses are present [No Edema] : there was no peripheral edema [No Extremity Clubbing/Cyanosis] : no extremity clubbing/cyanosis [Soft] : abdomen soft [Non Tender] : non-tender [Non-distended] : non-distended [No Masses] : no abdominal mass palpated [No HSM] : no HSM [Normal Bowel Sounds] : normal bowel sounds [Normal Posterior Cervical Nodes] : no posterior cervical lymphadenopathy [Normal Anterior Cervical Nodes] : no anterior cervical lymphadenopathy [No CVA Tenderness] : no CVA  tenderness [No Spinal Tenderness] : no spinal tenderness [No Joint Swelling] : no joint swelling [Grossly Normal Strength/Tone] : grossly normal strength/tone [No Rash] : no rash [Coordination Grossly Intact] : coordination grossly intact [No Focal Deficits] : no focal deficits [Normal Affect] : the affect was normal [Normal Insight/Judgement] : insight and judgment were intact [de-identified] : wgt decreased by 4# since 2/2025  [de-identified] : 1-2/6 soft quiet murmur systolic murmur heard best along LUSB; 2+ B DP pulses [de-identified] : ,  [de-identified] : +sclerotic joints B hands/wrists/knees etc but no s/sxs acute synovitis  [de-identified] : no jaundice or petechiae or unusual bruising  [de-identified] : gait is slowed and she uses cane /daughter for assistance in office (and she uses walker in home always) [de-identified] : good eye contact, easily engaged in conversation, somewhat tired affect

## 2025-03-21 NOTE — HISTORY OF PRESENT ILLNESS
[Other: _____] : [unfilled] [FreeTextEntry1] : RASHID WALLS is a 94 year old female here for a physical exam. [de-identified] : Her last physical exam was last year  Vaccines: Tetanus is up to date though can consider booster again, Tdap 4/2015 Pneumococcal vaccination is up to date though can consider booster as last dose was in 9/2015 Shingrix is up to date  Her last dentist visit was within past 6-12 months Her last eye doctor appointment was less than one year ago Her last dermatologist visit was 11/2022, Dr. Lozano/Luisa, has also seen Dr. Castro prn for skin focused. Will sched for near future   GYN visit is no longer indicated at this stage of her life; last 2008 Mammogram is no longer indicated at this stage of her life; last 11/2008 Colon cancer screening is no longer indicated at this stage of her life; colonoscopy 3/2012 benign small polyp DEXA is up to date, 3/2024 -2.6 fem neck lowest T score, overall stable from prior. Dr. Branch advised her to continue Reclast for now. PTH mildly elevated at Jan 2025 lab check and likely related to her Reclast as opposed to true hyperparathyroidism. They will d/w Dr. Branch to get her input. Dr. Branch is considering a 1 yr course of Prolia and then change back to Reclast after that   Her diet is healthy overall Exercise: PT BIW, some light wgt training, trying to do some chair based exercises daily and some walking  Mrs. Staton has high cholesterol, HTN, MARLON (on CPAP), h/o severe aortic stenosis (s/p TAVR 4/2023), IFG, GERD, chronic pain/OA/lumbar spinal stenosis, osteoporosis, depression/anxiety, h/o anemia (resolved)  Mrs. Pack s specialists include: Dr. Peggy Blas ([facility], card), Dr. Shelton Tran ([facility], [place] surg), Dr. Jaison Aranda (GI), Dr. Momin (pain mgmt.), Dr. Zuniga (ortho, was d/c to prn f/u at most recent visit 9/2023), Dr. Leon (ortho/hand), and Dr. Braxton (neuro), Dr. Alberts (ENT), Dr. Wong (podiatry)  Taking Pravastatin 20 mg TIW and 10 mg other days as higher doses (daily 20 mg dose) caused worsening of her chronic leg pains.  She is using CPAP consistently which has helped to decrease daytime fatigue. Memory is not improved with treating the MARLON or with time past 2022 hospitalization/trauma/surgery/anaesthesia, though memory also not clearly worsening for now so for now they are deferred on trial of donepezil which is reasonable.   No new or acute sxs but sxs of fatigue and joint pain etc have been slowly worsening over time. She takes Tylenol 4 pills daily for pain. Can not tolerate Tramadol or opioids. Avoids NSAIDs given age and cardiac and other comorbidities. CBD used but not making signif difference in her sxs. Uses Lidoderm patches.  Duloxetine 60 mg helps with her back pain signif. Legs feel painful from B knees down. Sensation is decreased and feels like they don't move the way she wants them to. Doing PT and trying to be more active at home. Not clearly tingling/burning pain but might be neuropathic pain from what she is describing. Dr. Braxton did not feel she needs to consider EMG/NCV at this time; they will let her know if signif change in pattern/intensity of sxs.  Duloxetine does not help her mood/depression as much as the sertraline did though as she is on Duloxetine higher dose now we had to wean her off the sertraline to decrease risk for serotonin syndrome etc. For this reason I am also reluctant to consider adding a med like Wellbutrin to her Duloxetine regimen. Mrs. Pack and family have been trying to help boost mood with self care measures such as mindful/relaxation breathing, getting a little exercise as best she can etc. Mood is doing ok for now. They defer on psychiatry and  referral at this time though will keep in mind.  She changed to Mediterranean eating plan through [facility] and has been losing wgt for the first time in years!

## 2025-03-21 NOTE — ASSESSMENT
[FreeTextEntry1] : RASHID WALLS is a 94 year old female here for a physical exam.  She is also here to follow up on medical issues as noted above.  Mrs. Staton has high cholesterol, HTN, MARLON (on CPAP), h/o severe aortic stenosis (s/p TAVR 4/2023), IFG, GERD, chronic pain/OA/lumbar spinal stenosis, osteoporosis, depression/anxiety, h/o anemia (resolved)  PTH mildly elevated at Jan 2025 lab check and likely related to her Reclast as opposed to true hyperparathyroidism. They will d/w Dr. Branch to get her input re if that is possible though they note that Dr. Branch said was not concerned about her PTH level at this point .

## 2025-03-21 NOTE — PLAN
[FreeTextEntry1] : Continue all medications as prescribed. Check labs as above. Will adjust any medications based upon lab results.  Reviewed age-appropriate preventive screening tests with patient. UTD on DEXA screening.  Routine gyn exam/pap, mammogram and CRC screening no longer medically indicated at this stage of life. Due for Derm visit and will schedule.   Reviewed/recommended annual flu vaccine, Tdap booster, and pneumococcal booster . She agrees to PCV 20 today   BP goal is <=135/85 on average on home checks. Advised to let us know if BPs are above goal on home checks.  Lifestyle measures to optimize BP reviewed, including regular exercise, adequate sleep, Mediterranean or DASH style clean eating, mindfulness/meditation, magnesium 100-400 mg supplementation, avoid NSAIDS, stress management techniques etc.  LDL goal <=70 ideally if able to achieve that but if her LDL is <100 (as it was at last check) and that is the max dose of statin she can tolerate I think that is a reasonable and perhaps more achievable goal for her at this stage of her life. Reviewed risks/benefits of statin med. Recommended excellent hydration +/- co Q10 (100-400 mg daily) to decrease risk for statin related myalgias.  Reviewed Mediterranean style eating plan, regular physical exercise, stay well hydrated, do regular brain/cognitive exercise/challenges (eg puzzles of any type etc), avoid alcohol, mindfulness/meditation, vit D, eat omega three rich foods daily +/- use omega three supplement, keep engaged socially, stress reduction measures etc to help promote healthy brain aging.  Discussed clean eating (eg Mediterranean style plant focused whole food eating plan) and regular exercise/staying as physically active as possible. Include balance exercises and strength training and core strengthening exercises for bone health and to decrease risk for falls.  Revd risks of untreated MARLON including increased risk for CV events, elevated BP/labile HTN, increased risk for sudden death and accidents, increased risk for arrhythmias, TIA/CVA, insulin resistance, severe fatigue, daytime somnolence, weight gain/difficulty with weight loss etc. Treating sleep apnea optimally will help to improve her energy levels, improve BP, help achieve/maintain a healthier weight, lower CV risk etc.   Recommend caution/avoidance with driving or other activities that require significant focus and concentration until can get MARLON optimally treated. Reviewed/recommended good sleep hygiene, try to avoid sleeping on back (consider use of tennis ball duct taped to T shirt to assist with this), eat cleanly, exercise regularly, try to lose a little weight as even relatively small weight loss (5-10% of body weight) can result in improvement in sleep apnea, monitor BPs etc.  Recommended Tylenol XS or Arthritis 1-2 pills BID-TID if helpful, should really avoid/minimize NSAIDs due to higher risk for SE from these meds at her age (and revd r/b/se of NSAIDs incl CV, renal and GI etc), regular stretching, heat/ice prn, consider turmeric supplementation, consider CBD cream or oral options, gentle yoga/chair yoga, strengthen core muscles, consider chiro and/or massage and/or acupuncture. If these measures are not helpful enough then follow up with ortho and/or pain  for further eval and treatment. D/w pain mgmt re what pain treatment plan can be-- ?medical marijuana trial, other options. Try heating pad prn  Recommended calcium 4473-8701 mg daily ideally mainly or fully from food sources +/- supplement if needed, vit D 8175-4584 IU or whatever dose is needed to get D into 30-80 range. Recommended regular weight bearing exercise as well as strength training exercise 3 or more times per week and balance exercises regularly.  Reviewed importance of good self care (e.g. meditation, yoga, adequate rest, regular exercise, magnesium, clean eating, etc.).  Follow up with specialists as recommended by them. Asked her to have any/all specialists outside [facility] send consult notes/test results etc to keep me informed.   Follow up for next physical in one year. Follow up as scheduled in 2-3 months for RPA visit/repeat labs. Liv and Mrs. Pack prefer to continue closer follow up Qfew months given Mrs. RAMIREZ's multiple medical issues, though they have option to start to space out visits if comfortable doing so

## 2025-03-21 NOTE — PLAN
[FreeTextEntry1] : Continue all medications as prescribed. Check labs as above. Will adjust any medications based upon lab results.  Reviewed age-appropriate preventive screening tests with patient. UTD on DEXA screening.  Routine gyn exam/pap, mammogram and CRC screening no longer medically indicated at this stage of life. Due for Derm visit and will schedule.   Reviewed/recommended annual flu vaccine, Tdap booster, and pneumococcal booster . She agrees to PCV 20 today   BP goal is <=135/85 on average on home checks. Advised to let us know if BPs are above goal on home checks.  Lifestyle measures to optimize BP reviewed, including regular exercise, adequate sleep, Mediterranean or DASH style clean eating, mindfulness/meditation, magnesium 100-400 mg supplementation, avoid NSAIDS, stress management techniques etc.  LDL goal <=70 ideally if able to achieve that but if her LDL is <100 (as it was at last check) and that is the max dose of statin she can tolerate I think that is a reasonable and perhaps more achievable goal for her at this stage of her life. Reviewed risks/benefits of statin med. Recommended excellent hydration +/- co Q10 (100-400 mg daily) to decrease risk for statin related myalgias.  Reviewed Mediterranean style eating plan, regular physical exercise, stay well hydrated, do regular brain/cognitive exercise/challenges (eg puzzles of any type etc), avoid alcohol, mindfulness/meditation, vit D, eat omega three rich foods daily +/- use omega three supplement, keep engaged socially, stress reduction measures etc to help promote healthy brain aging.  Discussed clean eating (eg Mediterranean style plant focused whole food eating plan) and regular exercise/staying as physically active as possible. Include balance exercises and strength training and core strengthening exercises for bone health and to decrease risk for falls.  Revd risks of untreated MARLON including increased risk for CV events, elevated BP/labile HTN, increased risk for sudden death and accidents, increased risk for arrhythmias, TIA/CVA, insulin resistance, severe fatigue, daytime somnolence, weight gain/difficulty with weight loss etc. Treating sleep apnea optimally will help to improve her energy levels, improve BP, help achieve/maintain a healthier weight, lower CV risk etc.   Recommend caution/avoidance with driving or other activities that require significant focus and concentration until can get MARLON optimally treated. Reviewed/recommended good sleep hygiene, try to avoid sleeping on back (consider use of tennis ball duct taped to T shirt to assist with this), eat cleanly, exercise regularly, try to lose a little weight as even relatively small weight loss (5-10% of body weight) can result in improvement in sleep apnea, monitor BPs etc.  Recommended Tylenol XS or Arthritis 1-2 pills BID-TID if helpful, should really avoid/minimize NSAIDs due to higher risk for SE from these meds at her age (and revd r/b/se of NSAIDs incl CV, renal and GI etc), regular stretching, heat/ice prn, consider turmeric supplementation, consider CBD cream or oral options, gentle yoga/chair yoga, strengthen core muscles, consider chiro and/or massage and/or acupuncture. If these measures are not helpful enough then follow up with ortho and/or pain  for further eval and treatment. D/w pain mgmt re what pain treatment plan can be-- ?medical marijuana trial, other options. Try heating pad prn  Recommended calcium 1114-7899 mg daily ideally mainly or fully from food sources +/- supplement if needed, vit D 6860-9433 IU or whatever dose is needed to get D into 30-80 range. Recommended regular weight bearing exercise as well as strength training exercise 3 or more times per week and balance exercises regularly.  Reviewed importance of good self care (e.g. meditation, yoga, adequate rest, regular exercise, magnesium, clean eating, etc.).  Follow up with specialists as recommended by them. Asked her to have any/all specialists outside [facility] send consult notes/test results etc to keep me informed.   Follow up for next physical in one year. Follow up as scheduled in 2-3 months for RPA visit/repeat labs. Liv and Mrs. Pack prefer to continue closer follow up Qfew months given Mrs. RAMIREZ's multiple medical issues, though they have option to start to space out visits if comfortable doing so

## 2025-03-21 NOTE — HEALTH RISK ASSESSMENT
[No falls in past year] : Patient reported no falls in the past year [0] : 2) Feeling down, depressed, or hopeless: Not at all (0) [PHQ-2 Negative - No further assessment needed] : PHQ-2 Negative - No further assessment needed [Never] : Never [[id number]] : 0

## 2025-03-21 NOTE — HEALTH RISK ASSESSMENT
[No falls in past year] : Patient reported no falls in the past year [0] : 2) Feeling down, depressed, or hopeless: Not at all (0) [PHQ-2 Negative - No further assessment needed] : PHQ-2 Negative - No further assessment needed [Never] : Never [[id number]] : 0

## 2025-03-21 NOTE — REVIEW OF SYSTEMS
[Fatigue] : fatigue [Recent Change In Weight] : ~T recent weight change [Constipation] : constipation [Heartburn] : heartburn [Joint Pain] : joint pain [Joint Stiffness] : joint stiffness [Back Pain] : back pain [Memory Loss] : memory loss [Unsteady Walking] : ataxia [Depression] : depression [Easy Bruising] : easy bruising [Negative] : Integumentary [Fever] : no fever [Chills] : no chills [Abdominal Pain] : no abdominal pain [Diarrhea] : diarrhea [Vomiting] : no vomiting [Melena] : no melena [Headache] : no headache [Dizziness] : no dizziness [Fainting] : no fainting [Suicidal] : not suicidal [Insomnia] : no insomnia [Anxiety] : no anxiety [Easy Bleeding] : no easy bleeding [Swollen Glands] : no swollen glands [FreeTextEntry2] : +fatigue chronically, stable recently, trying to be a bit more active, has been losing weight with following Mediterranean style eating plan with nutritionist through [facility] and has lost 5 pounds so far  [FreeTextEntry7] : GERD, uses PPI med prn with good results; chronic constipation managed with Miralax, has had GI eval (Dr. Aranda) in the past but not for a few yrs, +expanding abd girth over past year plus, see HPI [FreeTextEntry9] : usual OA and spinal stenosis related aches and pains/stiffness [de-identified] : Neuropathic pain L arm s/p arm injury 8/2022, gait improving with time and Pt exercises, neuropathic pain B legs from knees to toes  [de-identified] : depression/anxiety due to multiple health issues, overall seems to be doing well on duloxetine at this point  [de-identified] : easier bruising over past couple of years though stable

## 2025-03-21 NOTE — HISTORY OF PRESENT ILLNESS
[Other: _____] : [unfilled] [FreeTextEntry1] : RASHID WALLS is a 94 year old female here for a physical exam. [de-identified] : Her last physical exam was last year  Vaccines: Tetanus is up to date though can consider booster again, Tdap 4/2015 Pneumococcal vaccination is up to date though can consider booster as last dose was in 9/2015 Shingrix is up to date  Her last dentist visit was within past 6-12 months Her last eye doctor appointment was less than one year ago Her last dermatologist visit was 11/2022, Dr. Lozano/Luisa, has also seen Dr. Castro prn for skin focused. Will sched for near future   GYN visit is no longer indicated at this stage of her life; last 2008 Mammogram is no longer indicated at this stage of her life; last 11/2008 Colon cancer screening is no longer indicated at this stage of her life; colonoscopy 3/2012 benign small polyp DEXA is up to date, 3/2024 -2.6 fem neck lowest T score, overall stable from prior. Dr. Branch advised her to continue Reclast for now. PTH mildly elevated at Jan 2025 lab check and likely related to her Reclast as opposed to true hyperparathyroidism. They will d/w Dr. Branch to get her input. Dr. Branch is considering a 1 yr course of Prolia and then change back to Reclast after that   Her diet is healthy overall Exercise: PT BIW, some light wgt training, trying to do some chair based exercises daily and some walking  Mrs. Staton has high cholesterol, HTN, MARLON (on CPAP), h/o severe aortic stenosis (s/p TAVR 4/2023), IFG, GERD, chronic pain/OA/lumbar spinal stenosis, osteoporosis, depression/anxiety, h/o anemia (resolved)  Mrs. Pack s specialists include: Dr. Peggy Blas ([facility], card), Dr. Shelton Tran ([facility], [place] surg), Dr. Jaison Aranda (GI), Dr. Momin (pain mgmt.), Dr. Zuniga (ortho, was d/c to prn f/u at most recent visit 9/2023), Dr. Leon (ortho/hand), and Dr. Braxton (neuro), Dr. Alberts (ENT), Dr. Wong (podiatry)  Taking Pravastatin 20 mg TIW and 10 mg other days as higher doses (daily 20 mg dose) caused worsening of her chronic leg pains.  She is using CPAP consistently which has helped to decrease daytime fatigue. Memory is not improved with treating the MARLON or with time past 2022 hospitalization/trauma/surgery/anaesthesia, though memory also not clearly worsening for now so for now they are deferred on trial of donepezil which is reasonable.   No new or acute sxs but sxs of fatigue and joint pain etc have been slowly worsening over time. She takes Tylenol 4 pills daily for pain. Can not tolerate Tramadol or opioids. Avoids NSAIDs given age and cardiac and other comorbidities. CBD used but not making signif difference in her sxs. Uses Lidoderm patches.  Duloxetine 60 mg helps with her back pain signif. Legs feel painful from B knees down. Sensation is decreased and feels like they don't move the way she wants them to. Doing PT and trying to be more active at home. Not clearly tingling/burning pain but might be neuropathic pain from what she is describing. Dr. Braxton did not feel she needs to consider EMG/NCV at this time; they will let her know if signif change in pattern/intensity of sxs.  Duloxetine does not help her mood/depression as much as the sertraline did though as she is on Duloxetine higher dose now we had to wean her off the sertraline to decrease risk for serotonin syndrome etc. For this reason I am also reluctant to consider adding a med like Wellbutrin to her Duloxetine regimen. Mrs. Pack and family have been trying to help boost mood with self care measures such as mindful/relaxation breathing, getting a little exercise as best she can etc. Mood is doing ok for now. They defer on psychiatry and  referral at this time though will keep in mind.  She changed to Mediterranean eating plan through [facility] and has been losing wgt for the first time in years!

## 2025-03-21 NOTE — REVIEW OF SYSTEMS
[Fatigue] : fatigue [Recent Change In Weight] : ~T recent weight change [Constipation] : constipation [Heartburn] : heartburn [Joint Pain] : joint pain [Joint Stiffness] : joint stiffness [Back Pain] : back pain [Memory Loss] : memory loss [Unsteady Walking] : ataxia [Depression] : depression [Easy Bruising] : easy bruising [Negative] : Integumentary [Fever] : no fever [Chills] : no chills [Abdominal Pain] : no abdominal pain [Diarrhea] : diarrhea [Vomiting] : no vomiting [Melena] : no melena [Headache] : no headache [Dizziness] : no dizziness [Fainting] : no fainting [Suicidal] : not suicidal [Insomnia] : no insomnia [Anxiety] : no anxiety [Easy Bleeding] : no easy bleeding [Swollen Glands] : no swollen glands [FreeTextEntry2] : +fatigue chronically, stable recently, trying to be a bit more active, has been losing weight with following Mediterranean style eating plan with nutritionist through [facility] and has lost 5 pounds so far  [FreeTextEntry7] : GERD, uses PPI med prn with good results; chronic constipation managed with Miralax, has had GI eval (Dr. Aranda) in the past but not for a few yrs, +expanding abd girth over past year plus, see HPI [FreeTextEntry9] : usual OA and spinal stenosis related aches and pains/stiffness [de-identified] : Neuropathic pain L arm s/p arm injury 8/2022, gait improving with time and Pt exercises, neuropathic pain B legs from knees to toes  [de-identified] : depression/anxiety due to multiple health issues, overall seems to be doing well on duloxetine at this point  [de-identified] : easier bruising over past couple of years though stable

## 2025-03-21 NOTE — PHYSICAL EXAM
[No Acute Distress] : no acute distress [Well Nourished] : well nourished [Well Developed] : well developed [Well-Appearing] : well-appearing [Normal Sclera/Conjunctiva] : normal sclera/conjunctiva [EOMI] : extraocular movements intact [Normal Outer Ear/Nose] : the outer ears and nose were normal in appearance [Normal Oropharynx] : the oropharynx was normal [No JVD] : no jugular venous distention [No Lymphadenopathy] : no lymphadenopathy [Supple] : supple [Thyroid Normal, No Nodules] : the thyroid was normal and there were no nodules present [No Respiratory Distress] : no respiratory distress  [No Accessory Muscle Use] : no accessory muscle use [Clear to Auscultation] : lungs were clear to auscultation bilaterally [Normal Rate] : normal rate  [Regular Rhythm] : with a regular rhythm [Normal S1, S2] : normal S1 and S2 [No Carotid Bruits] : no carotid bruits [Pedal Pulses Present] : the pedal pulses are present [No Edema] : there was no peripheral edema [No Extremity Clubbing/Cyanosis] : no extremity clubbing/cyanosis [Soft] : abdomen soft [Non Tender] : non-tender [Non-distended] : non-distended [No Masses] : no abdominal mass palpated [No HSM] : no HSM [Normal Bowel Sounds] : normal bowel sounds [Normal Posterior Cervical Nodes] : no posterior cervical lymphadenopathy [Normal Anterior Cervical Nodes] : no anterior cervical lymphadenopathy [No CVA Tenderness] : no CVA  tenderness [No Spinal Tenderness] : no spinal tenderness [No Joint Swelling] : no joint swelling [Grossly Normal Strength/Tone] : grossly normal strength/tone [No Rash] : no rash [Coordination Grossly Intact] : coordination grossly intact [No Focal Deficits] : no focal deficits [Normal Affect] : the affect was normal [Normal Insight/Judgement] : insight and judgment were intact [de-identified] : wgt decreased by 4# since 2/2025  [de-identified] : 1-2/6 soft quiet murmur systolic murmur heard best along LUSB; 2+ B DP pulses [de-identified] : ,  [de-identified] : +sclerotic joints B hands/wrists/knees etc but no s/sxs acute synovitis  [de-identified] : no jaundice or petechiae or unusual bruising  [de-identified] : gait is slowed and she uses cane /daughter for assistance in office (and she uses walker in home always) [de-identified] : good eye contact, easily engaged in conversation, somewhat tired affect

## 2025-06-25 NOTE — PHYSICAL EXAM
[No Acute Distress] : no acute distress General [Well Nourished] : well nourished [Well Developed] : well developed [Well-Appearing] : well-appearing [Normal Sclera/Conjunctiva] : normal sclera/conjunctiva [EOMI] : extraocular movements intact [Normal Outer Ear/Nose] : the outer ears and nose were normal in appearance [Normal Oropharynx] : the oropharynx was normal [No JVD] : no jugular venous distention [No Lymphadenopathy] : no lymphadenopathy [Supple] : supple [Thyroid Normal, No Nodules] : the thyroid was normal and there were no nodules present [No Respiratory Distress] : no respiratory distress  [No Accessory Muscle Use] : no accessory muscle use [Clear to Auscultation] : lungs were clear to auscultation bilaterally [Normal Rate] : normal rate  [Regular Rhythm] : with a regular rhythm [Normal S1, S2] : normal S1 and S2 [No Carotid Bruits] : no carotid bruits [Pedal Pulses Present] : the pedal pulses are present [No Edema] : there was no peripheral edema [No Extremity Clubbing/Cyanosis] : no extremity clubbing/cyanosis [Soft] : abdomen soft [Non Tender] : non-tender [Non-distended] : non-distended [No Masses] : no abdominal mass palpated [No HSM] : no HSM [Normal Bowel Sounds] : normal bowel sounds [Normal Posterior Cervical Nodes] : no posterior cervical lymphadenopathy [Normal Anterior Cervical Nodes] : no anterior cervical lymphadenopathy [No CVA Tenderness] : no CVA  tenderness [No Spinal Tenderness] : no spinal tenderness [No Joint Swelling] : no joint swelling [Grossly Normal Strength/Tone] : grossly normal strength/tone [No Rash] : no rash [Coordination Grossly Intact] : coordination grossly intact [No Focal Deficits] : no focal deficits [Normal Affect] : the affect was normal [Normal Insight/Judgement] : insight and judgment were intact [de-identified] : wgt decreased by addtl 4# since 3/2025  [de-identified] : 1-2/6 soft quiet murmur systolic murmur heard best along LUSB; 2+ B DP pulses [de-identified] : ,  [de-identified] : +sclerotic joints B hands/wrists/knees etc but no s/sxs acute synovitis  [de-identified] : no jaundice or petechiae or unusual bruising  [de-identified] : gait is slowed and she uses cane /daughter for assistance in office (and she uses walker in home always) [de-identified] : good eye contact, easily engaged in conversation, somewhat tired affect

## 2025-06-25 NOTE — HEALTH RISK ASSESSMENT
[No falls in past year] : Patient reported no falls in the past year [0] : 2) Feeling down, depressed, or hopeless: Not at all (0) [PHQ-2 Negative - No further assessment needed] : PHQ-2 Negative - No further assessment needed [Never] : Never [[id number]] : 0

## 2025-06-25 NOTE — HEALTH RISK ASSESSMENT
[No falls in past year] : Patient reported no falls in the past year [0] : 2) Feeling down, depressed, or hopeless: Not at all (0) [PHQ-2 Negative - No further assessment needed] : PHQ-2 Negative - No further assessment needed [Never] : Never [[id number]] : 0

## 2025-06-25 NOTE — REVIEW OF SYSTEMS
[Fatigue] : fatigue [Recent Change In Weight] : ~T recent weight change [Constipation] : constipation [Heartburn] : heartburn [Joint Pain] : joint pain [Joint Stiffness] : joint stiffness [Back Pain] : back pain [Memory Loss] : memory loss [Unsteady Walking] : ataxia [Depression] : depression [Easy Bruising] : easy bruising [Negative] : Integumentary [Fever] : no fever [Chills] : no chills [Abdominal Pain] : no abdominal pain [Diarrhea] : diarrhea [Vomiting] : no vomiting [Melena] : no melena [Headache] : no headache [Dizziness] : no dizziness [Fainting] : no fainting [Suicidal] : not suicidal [Insomnia] : no insomnia [Anxiety] : no anxiety [Easy Bleeding] : no easy bleeding [Swollen Glands] : no swollen glands [FreeTextEntry2] : +fatigue chronically, stable recently, trying to be a bit more active, has been losing weight with following Mediterranean style eating plan with nutritionist through [facility] and has lost 5 pounds so far  [FreeTextEntry7] : GERD, uses PPI med prn with good results; chronic constipation managed with Miralax, has had GI eval (Dr. Aranda) in the past but not for a few yrs, +expanding abd girth over past year plus, see HPI [FreeTextEntry9] : usual OA and spinal stenosis related aches and pains/stiffness [de-identified] : Neuropathic pain L arm s/p arm injury 8/2022, gait improving with time and Pt exercises, neuropathic pain B legs from knees to toes  [de-identified] : depression/anxiety due to multiple health issues, overall seems to be doing well on duloxetine at this point  [de-identified] : easier bruising over past couple of years though stable

## 2025-06-25 NOTE — HISTORY OF PRESENT ILLNESS
[Other: _____] : [unfilled] [FreeTextEntry1] : RASHID WALLS is a 95 year old female here for a follow up visit.   [de-identified] : Mrs. Staton has high cholesterol, HTN with severe LA dilatation, MARLON (on CPAP), h/o severe aortic stenosis (s/p TAVR 4/2023), IFG, GERD, chronic pain/OA/lumbar spinal stenosis, osteoporosis, depression/anxiety, h/o anemia (resolved), and 4.1 cm left kidney complicated cyst  See prior notes for addtl details.  Mrs. Pack s specialists include: Dr. Peggy Blas ([facility], card), Dr. Shelton Tran ([facility], CT surg), Dr. Jaison Aranda (GI), Dr. Momin (pain mgmt.), Dr. Zuniga (ortho, was d/c to prn f/u at most recent visit 9/2023), Dr. Leon (ortho/hand), and Dr. Braxton (neuro), Dr. Alberts (ENT), Dr. Wong (podiatry)  Taking Pravastatin 20 mg TIW and 10 mg other days as higher doses (daily 20 mg dose) caused worsening of her chronic leg pains.

## 2025-06-25 NOTE — PHYSICAL EXAM
[No Acute Distress] : no acute distress [Well Nourished] : well nourished [Well Developed] : well developed [Well-Appearing] : well-appearing [Normal Sclera/Conjunctiva] : normal sclera/conjunctiva [EOMI] : extraocular movements intact [Normal Outer Ear/Nose] : the outer ears and nose were normal in appearance [Normal Oropharynx] : the oropharynx was normal [No JVD] : no jugular venous distention [No Lymphadenopathy] : no lymphadenopathy [Supple] : supple [Thyroid Normal, No Nodules] : the thyroid was normal and there were no nodules present [No Respiratory Distress] : no respiratory distress  [No Accessory Muscle Use] : no accessory muscle use [Clear to Auscultation] : lungs were clear to auscultation bilaterally [Normal Rate] : normal rate  [Regular Rhythm] : with a regular rhythm [Normal S1, S2] : normal S1 and S2 [No Carotid Bruits] : no carotid bruits [Pedal Pulses Present] : the pedal pulses are present [No Edema] : there was no peripheral edema [No Extremity Clubbing/Cyanosis] : no extremity clubbing/cyanosis [Soft] : abdomen soft [Non Tender] : non-tender [Non-distended] : non-distended [No Masses] : no abdominal mass palpated [No HSM] : no HSM [Normal Bowel Sounds] : normal bowel sounds [Normal Posterior Cervical Nodes] : no posterior cervical lymphadenopathy [Normal Anterior Cervical Nodes] : no anterior cervical lymphadenopathy [No CVA Tenderness] : no CVA  tenderness [No Spinal Tenderness] : no spinal tenderness [No Joint Swelling] : no joint swelling [Grossly Normal Strength/Tone] : grossly normal strength/tone [No Rash] : no rash [Coordination Grossly Intact] : coordination grossly intact [No Focal Deficits] : no focal deficits [Normal Affect] : the affect was normal [Normal Insight/Judgement] : insight and judgment were intact [de-identified] : wgt decreased by addtl 4# since 3/2025  [de-identified] : 1-2/6 soft quiet murmur systolic murmur heard best along LUSB; 2+ B DP pulses [de-identified] : ,  [de-identified] : +sclerotic joints B hands/wrists/knees etc but no s/sxs acute synovitis  [de-identified] : no jaundice or petechiae or unusual bruising  [de-identified] : gait is slowed and she uses cane /daughter for assistance in office (and she uses walker in home always) [de-identified] : good eye contact, easily engaged in conversation, somewhat tired affect

## 2025-06-25 NOTE — ASSESSMENT
[FreeTextEntry1] : RASHID WALLS is a 95 year old female here for follow up on medical issues as noted above.  Mrs. Staton has high cholesterol, HTN with severe LA dilatation, MARLON (on CPAP), h/o severe aortic stenosis (s/p TAVR 4/2023), IFG, GERD, chronic pain/OA/lumbar spinal stenosis, osteoporosis, depression/anxiety, h/o anemia (resolved), and 4.1 cm left kidney complicated cyst  Labs in March 2025 all good (not concerned re mildly elevated glu 109 as A1C was wnl 5.6%), LDL 54 (goal is <70) on current Pravasatatin regimen as noted above.   She had acute zoster early June 2025. Treated with a course of po Valacyclovir. Rash resolved and dysesthesia/pain resolving. She had Shingrix series in 2020. Fortunately was mild and is resolved  Since last visit here she had had updated card eval with Dr. Blas in Spring 2025. Echo showed severe LA dilatation, worse from prior year (was listed as only mild LA dilatation on the June 2024 report). Also had CXR (decreased BS on exam) and was ok (lg HH only)  Now has apple watch and is monitoring for atrial fib; no AF detected so far.   Given wgt loss over past year being an addtl RF for bone density Dr. Branch recommended she use Prolia for 2025 x 2 doses. Had Prolia infection 6/19/25. [place] fine day of injection though was achier than usual for few days and then resolved. Also constipated since then (not typical for her with daily Miralax) though managing with a little extra Miralax. Plan is 1 more dose of Prolia and then transition back to Reclast in 2026. Is about at her wgt goal and we disc aim to hold wgt stable at this point is reasonable goal  Upcoming appts: Has appt with Dr. Momin pain mgmt in July  Has appt with Dr. Castro for skin check in July.  Will see Dr. coronel in Fall 2025 for updated eval re 4.1 cm LEFT kidney complicated cyst; if again stable she can d/w Dr. Coronel if any further surveillance imaging needed and if so can I do this for her (eg periodic US).

## 2025-06-25 NOTE — ASSESSMENT
[FreeTextEntry1] : RASHID WALLS is a 95 year old female here for follow up on medical issues as noted above.  Mrs. Staton has high cholesterol, HTN with severe LA dilatation, MARLON (on CPAP), h/o severe aortic stenosis (s/p TAVR 4/2023), IFG, GERD, chronic pain/OA/lumbar spinal stenosis, osteoporosis, depression/anxiety, h/o anemia (resolved), and 4.1 cm left kidney complicated cyst  Labs in March 2025 all good (not concerned re mildly elevated glu 109 as A1C was wnl 5.6%), LDL 54 (goal is <70) on current Pravasatatin regimen as noted above.   She had acute zoster early June 2025. Treated with a course of po Valacyclovir. Rash resolved and dysesthesia/pain resolving. She had Shingrix series in 2020. Fortunately was mild and is resolved  Since last visit here she had had updated card eval with Dr. Blas in Spring 2025. Echo showed severe LA dilatation, worse from prior year (was listed as only mild LA dilatation on the June 2024 report). Also had CXR (decreased BS on exam) and was ok (lg HH only)  Now has apple watch and is monitoring for atrial fib; no AF detected so far.   Given wgt loss over past year being an addtl RF for bone density Dr. Branch recommended she use Prolia for 2025 x 2 doses. Had Prolia infection 6/19/25. [place] fine day of injection though was achier than usual for few days and then resolved. Also constipated since then (not typical for her with daily Miralax) though managing with a little extra Miralax. Plan is 1 more dose of Prolia and then transition back to Reclast in 2026. Is about at her wgt goal and we disc aim to hold wgt stable at this point is reasonable goal  Upcoming appts: Has appt with Dr. Momin pain mgmt in July  Has appt with Dr. Castro for skin check in July.  Will see Dr. coronel in Fall 2025 for updated eval re 4.1 cm LEFT kidney complicated cyst; if again stable she can d/w Dr. Coronel if any further surveillance imaging needed and if so can I do this for her (eg periodic US).

## 2025-06-25 NOTE — REVIEW OF SYSTEMS
[Fatigue] : fatigue [Recent Change In Weight] : ~T recent weight change [Constipation] : constipation [Heartburn] : heartburn [Joint Pain] : joint pain [Joint Stiffness] : joint stiffness [Back Pain] : back pain [Memory Loss] : memory loss [Unsteady Walking] : ataxia [Depression] : depression [Easy Bruising] : easy bruising [Negative] : Integumentary [Fever] : no fever [Chills] : no chills [Abdominal Pain] : no abdominal pain [Diarrhea] : diarrhea [Vomiting] : no vomiting [Melena] : no melena [Headache] : no headache [Dizziness] : no dizziness [Fainting] : no fainting [Suicidal] : not suicidal [Insomnia] : no insomnia [Anxiety] : no anxiety [Easy Bleeding] : no easy bleeding [Swollen Glands] : no swollen glands [FreeTextEntry2] : +fatigue chronically, stable recently, trying to be a bit more active, has been losing weight with following Mediterranean style eating plan with nutritionist through [facility] and has lost 5 pounds so far  [FreeTextEntry7] : GERD, uses PPI med prn with good results; chronic constipation managed with Miralax, has had GI eval (Dr. Aranda) in the past but not for a few yrs, +expanding abd girth over past year plus, see HPI [FreeTextEntry9] : usual OA and spinal stenosis related aches and pains/stiffness [de-identified] : Neuropathic pain L arm s/p arm injury 8/2022, gait improving with time and Pt exercises, neuropathic pain B legs from knees to toes  [de-identified] : depression/anxiety due to multiple health issues, overall seems to be doing well on duloxetine at this point  [de-identified] : easier bruising over past couple of years though stable

## 2025-06-25 NOTE — PLAN
[FreeTextEntry1] : Continue all medications as prescribed. We revd option to check labs today vs defer and in the end opted to defer as labs late March 2025 all good and no new issues and specialists did not request any labs for today. Will check labs at her Oct 2025 visit, or sooner if any interim issues/cocerns  BP goal is <=135/85 on average on home checks. Advised to let us know if BPs are above goal on home checks.  Lifestyle measures to optimize BP reviewed, including regular exercise, adequate sleep, Mediterranean or DASH style clean eating, mindfulness/meditation, magnesium 100-400 mg supplementation, avoid NSAIDS, stress management techniques etc.  LDL goal <=70 ideally if able to achieve that (recently has been a that annmarie). Reviewed risks/benefits of statin med. Recommended excellent hydration +/- co Q10 (100-400 mg daily) to decrease risk for statin related myalgias.  Reviewed Mediterranean style eating plan, regular physical exercise, stay well hydrated, do regular brain/cognitive exercise/challenges (eg puzzles of any type etc), avoid alcohol, mindfulness/meditation, vit D, eat omega three rich foods daily +/- use omega three supplement, keep engaged socially, stress reduction measures etc to help promote healthy brain aging.  Discussed clean eating (eg Mediterranean style plant focused whole food eating plan) and regular exercise/staying as physically active as possible. Include balance exercises and strength training and core strengthening exercises for bone health and to decrease risk for falls.  Revd risks of untreated MARLON including increased risk for CV events, elevated BP/labile HTN, increased risk for sudden death and accidents, increased risk for arrhythmias, TIA/CVA, insulin resistance, severe fatigue, daytime somnolence, weight gain/difficulty with weight loss etc. Treating sleep apnea optimally will help to improve her energy levels, improve BP, help achieve/maintain a healthier weight, lower CV risk etc.  Recommend caution/avoidance with driving or other activities that require significant focus and concentration until can get MARLON optimally treated. Reviewed/recommended good sleep hygiene, try to avoid sleeping on back (consider use of tennis ball duct taped to T shirt to assist with this), eat cleanly, exercise regularly, try to lose a little weight as even relatively small weight loss (5-10% of body weight) can result in improvement in sleep apnea, monitor BPs etc.  Recommended Tylenol XS or Arthritis 1-2 pills BID-TID if helpful, should really avoid/minimize NSAIDs due to higher risk for SE from these meds at her age (and revd r/b/se of NSAIDs incl CV, renal and GI etc), regular stretching, heat/ice prn, consider turmeric supplementation, consider CBD cream or oral options, gentle yoga/chair yoga, strengthen core muscles, consider chiro and/or massage and/or acupuncture. If these measures are not helpful enough then follow up with ortho and/or pain  for further eval and treatment. D/w pain mgmt re what pain treatment plan can be-- ?medical marijuana trial, other options. Try heating pad prn  Recommended calcium 7074-2565 mg daily ideally mainly or fully from food sources +/- supplement if needed, vit D 1523-5091 IU or whatever dose is needed to get D into 30-80 range. Recommended regular weight bearing exercise as well as strength training exercise 3 or more times per week and balance exercises regularly.  Reviewed importance of good self care (e.g. meditation, yoga, adequate rest, regular exercise, magnesium, clean eating, etc.).  Follow up with specialists as recommended by them. Asked her to have any/all specialists outside [facility] network send consult notes/test results etc to keep me informed.  Follow up for next physical in one year. Follow up as scheduled in 4 months for RPA visit/repeat labs. Liv and Mrs. Pack prefer to continue closer follow up Qfew months given Mrs. RAMIREZ's multiple medical issues, though they have option to start to space out visits if comfortable doing so.  Time spent immediately before and after, as well as Face-to-face time spent during visit with patient, over half in discussion of the above diagnoses and treatment plan: 30 minutes.

## 2025-06-25 NOTE — PLAN
[FreeTextEntry1] : Continue all medications as prescribed. We revd option to check labs today vs defer and in the end opted to defer as labs late March 2025 all good and no new issues and specialists did not request any labs for today. Will check labs at her Oct 2025 visit, or sooner if any interim issues/cocerns  BP goal is <=135/85 on average on home checks. Advised to let us know if BPs are above goal on home checks.  Lifestyle measures to optimize BP reviewed, including regular exercise, adequate sleep, Mediterranean or DASH style clean eating, mindfulness/meditation, magnesium 100-400 mg supplementation, avoid NSAIDS, stress management techniques etc.  LDL goal <=70 ideally if able to achieve that (recently has been a that annmarie). Reviewed risks/benefits of statin med. Recommended excellent hydration +/- co Q10 (100-400 mg daily) to decrease risk for statin related myalgias.  Reviewed Mediterranean style eating plan, regular physical exercise, stay well hydrated, do regular brain/cognitive exercise/challenges (eg puzzles of any type etc), avoid alcohol, mindfulness/meditation, vit D, eat omega three rich foods daily +/- use omega three supplement, keep engaged socially, stress reduction measures etc to help promote healthy brain aging.  Discussed clean eating (eg Mediterranean style plant focused whole food eating plan) and regular exercise/staying as physically active as possible. Include balance exercises and strength training and core strengthening exercises for bone health and to decrease risk for falls.  Revd risks of untreated MARLON including increased risk for CV events, elevated BP/labile HTN, increased risk for sudden death and accidents, increased risk for arrhythmias, TIA/CVA, insulin resistance, severe fatigue, daytime somnolence, weight gain/difficulty with weight loss etc. Treating sleep apnea optimally will help to improve her energy levels, improve BP, help achieve/maintain a healthier weight, lower CV risk etc.  Recommend caution/avoidance with driving or other activities that require significant focus and concentration until can get MARLON optimally treated. Reviewed/recommended good sleep hygiene, try to avoid sleeping on back (consider use of tennis ball duct taped to T shirt to assist with this), eat cleanly, exercise regularly, try to lose a little weight as even relatively small weight loss (5-10% of body weight) can result in improvement in sleep apnea, monitor BPs etc.  Recommended Tylenol XS or Arthritis 1-2 pills BID-TID if helpful, should really avoid/minimize NSAIDs due to higher risk for SE from these meds at her age (and revd r/b/se of NSAIDs incl CV, renal and GI etc), regular stretching, heat/ice prn, consider turmeric supplementation, consider CBD cream or oral options, gentle yoga/chair yoga, strengthen core muscles, consider chiro and/or massage and/or acupuncture. If these measures are not helpful enough then follow up with ortho and/or pain  for further eval and treatment. D/w pain mgmt re what pain treatment plan can be-- ?medical marijuana trial, other options. Try heating pad prn  Recommended calcium 0528-7058 mg daily ideally mainly or fully from food sources +/- supplement if needed, vit D 8102-2486 IU or whatever dose is needed to get D into 30-80 range. Recommended regular weight bearing exercise as well as strength training exercise 3 or more times per week and balance exercises regularly.  Reviewed importance of good self care (e.g. meditation, yoga, adequate rest, regular exercise, magnesium, clean eating, etc.).  Follow up with specialists as recommended by them. Asked her to have any/all specialists outside [facility] network send consult notes/test results etc to keep me informed.  Follow up for next physical in one year. Follow up as scheduled in 4 months for RPA visit/repeat labs. Liv and Mrs. Pack prefer to continue closer follow up Qfew months given Mrs. RAMIREZ's multiple medical issues, though they have option to start to space out visits if comfortable doing so.  Time spent immediately before and after, as well as Face-to-face time spent during visit with patient, over half in discussion of the above diagnoses and treatment plan: 30 minutes.

## 2025-06-25 NOTE — HISTORY OF PRESENT ILLNESS
[Other: _____] : [unfilled] [FreeTextEntry1] : RASHID WALLS is a 95 year old female here for a follow up visit.   [de-identified] : Mrs. Staton has high cholesterol, HTN with severe LA dilatation, MARLON (on CPAP), h/o severe aortic stenosis (s/p TAVR 4/2023), IFG, GERD, chronic pain/OA/lumbar spinal stenosis, osteoporosis, depression/anxiety, h/o anemia (resolved), and 4.1 cm left kidney complicated cyst  See prior notes for addtl details.  Mrs. Pack s specialists include: Dr. Peggy Blas ([facility], card), Dr. Shelton Tran ([facility], CT surg), Dr. Jaison Aranda (GI), Dr. Momin (pain mgmt.), Dr. Zuniga (ortho, was d/c to prn f/u at most recent visit 9/2023), Dr. Leon (ortho/hand), and Dr. Braxton (neuro), Dr. Alberts (ENT), Dr. Wong (podiatry)  Taking Pravastatin 20 mg TIW and 10 mg other days as higher doses (daily 20 mg dose) caused worsening of her chronic leg pains.